# Patient Record
Sex: FEMALE | Race: WHITE
[De-identification: names, ages, dates, MRNs, and addresses within clinical notes are randomized per-mention and may not be internally consistent; named-entity substitution may affect disease eponyms.]

---

## 2023-01-05 ENCOUNTER — HOSPITAL ENCOUNTER (EMERGENCY)
Dept: HOSPITAL 47 - EC | Age: 48
Discharge: HOME | End: 2023-01-05
Payer: COMMERCIAL

## 2023-01-05 VITALS — SYSTOLIC BLOOD PRESSURE: 121 MMHG | HEART RATE: 64 BPM | DIASTOLIC BLOOD PRESSURE: 78 MMHG | TEMPERATURE: 99.1 F

## 2023-01-05 VITALS — RESPIRATION RATE: 18 BRPM

## 2023-01-05 DIAGNOSIS — Z88.2: ICD-10-CM

## 2023-01-05 DIAGNOSIS — R10.13: Primary | ICD-10-CM

## 2023-01-05 DIAGNOSIS — F32.A: ICD-10-CM

## 2023-01-05 DIAGNOSIS — Z90.49: ICD-10-CM

## 2023-01-05 DIAGNOSIS — F41.9: ICD-10-CM

## 2023-01-05 LAB
ALBUMIN SERPL-MCNC: 4.2 G/DL (ref 3.5–5)
ALP SERPL-CCNC: 61 U/L (ref 38–126)
ALT SERPL-CCNC: 19 U/L (ref 4–34)
AMYLASE SERPL-CCNC: 74 U/L (ref 30–110)
ANION GAP SERPL CALC-SCNC: 8 MMOL/L
APTT BLD: 23.4 SEC (ref 22–30)
AST SERPL-CCNC: 25 U/L (ref 14–36)
BASOPHILS # BLD AUTO: 0.1 K/UL (ref 0–0.2)
BASOPHILS NFR BLD AUTO: 1 %
BUN SERPL-SCNC: 16 MG/DL (ref 7–17)
CALCIUM SPEC-MCNC: 8.9 MG/DL (ref 8.4–10.2)
CHLORIDE SERPL-SCNC: 108 MMOL/L (ref 98–107)
CO2 SERPL-SCNC: 25 MMOL/L (ref 22–30)
EOSINOPHIL # BLD AUTO: 0.1 K/UL (ref 0–0.7)
EOSINOPHIL NFR BLD AUTO: 1 %
ERYTHROCYTE [DISTWIDTH] IN BLOOD BY AUTOMATED COUNT: 4.74 M/UL (ref 3.8–5.4)
ERYTHROCYTE [DISTWIDTH] IN BLOOD: 13.4 % (ref 11.5–15.5)
GLUCOSE SERPL-MCNC: 76 MG/DL (ref 74–99)
HCT VFR BLD AUTO: 43.8 % (ref 34–46)
HGB BLD-MCNC: 14.3 GM/DL (ref 11.4–16)
INR PPP: 0.9 (ref ?–1.2)
KETONES UR QL STRIP.AUTO: (no result)
LIPASE SERPL-CCNC: 65 U/L (ref 23–300)
LYMPHOCYTES # SPEC AUTO: 2.1 K/UL (ref 1–4.8)
LYMPHOCYTES NFR SPEC AUTO: 26 %
MCH RBC QN AUTO: 30.1 PG (ref 25–35)
MCHC RBC AUTO-ENTMCNC: 32.6 G/DL (ref 31–37)
MCV RBC AUTO: 92.5 FL (ref 80–100)
MONOCYTES # BLD AUTO: 0.4 K/UL (ref 0–1)
MONOCYTES NFR BLD AUTO: 5 %
NEUTROPHILS # BLD AUTO: 5.2 K/UL (ref 1.3–7.7)
NEUTROPHILS NFR BLD AUTO: 66 %
PH UR: 5.5 [PH] (ref 5–8)
PLATELET # BLD AUTO: 235 K/UL (ref 150–450)
POTASSIUM SERPL-SCNC: 3.8 MMOL/L (ref 3.5–5.1)
PROT SERPL-MCNC: 7.2 G/DL (ref 6.3–8.2)
PT BLD: 9.7 SEC (ref 9–12)
RBC UR QL: 2 /HPF (ref 0–5)
SODIUM SERPL-SCNC: 141 MMOL/L (ref 137–145)
SP GR UR: 1.02 (ref 1–1.03)
SQUAMOUS UR QL AUTO: 4 /HPF (ref 0–4)
UROBILINOGEN UR QL STRIP: <2 MG/DL (ref ?–2)
WBC # BLD AUTO: 7.9 K/UL (ref 3.8–10.6)
WBC # UR AUTO: 3 /HPF (ref 0–5)

## 2023-01-05 PROCEDURE — 83690 ASSAY OF LIPASE: CPT

## 2023-01-05 PROCEDURE — 81001 URINALYSIS AUTO W/SCOPE: CPT

## 2023-01-05 PROCEDURE — 84484 ASSAY OF TROPONIN QUANT: CPT

## 2023-01-05 PROCEDURE — 80053 COMPREHEN METABOLIC PANEL: CPT

## 2023-01-05 PROCEDURE — 71046 X-RAY EXAM CHEST 2 VIEWS: CPT

## 2023-01-05 PROCEDURE — 36415 COLL VENOUS BLD VENIPUNCTURE: CPT

## 2023-01-05 PROCEDURE — 85730 THROMBOPLASTIN TIME PARTIAL: CPT

## 2023-01-05 PROCEDURE — 96375 TX/PRO/DX INJ NEW DRUG ADDON: CPT

## 2023-01-05 PROCEDURE — 85610 PROTHROMBIN TIME: CPT

## 2023-01-05 PROCEDURE — 93005 ELECTROCARDIOGRAM TRACING: CPT

## 2023-01-05 PROCEDURE — 84703 CHORIONIC GONADOTROPIN ASSAY: CPT

## 2023-01-05 PROCEDURE — 83605 ASSAY OF LACTIC ACID: CPT

## 2023-01-05 PROCEDURE — 85025 COMPLETE CBC W/AUTO DIFF WBC: CPT

## 2023-01-05 PROCEDURE — 99284 EMERGENCY DEPT VISIT MOD MDM: CPT

## 2023-01-05 PROCEDURE — 96374 THER/PROPH/DIAG INJ IV PUSH: CPT

## 2023-01-05 PROCEDURE — 82150 ASSAY OF AMYLASE: CPT

## 2023-01-05 PROCEDURE — 74177 CT ABD & PELVIS W/CONTRAST: CPT

## 2023-01-05 NOTE — ED
General Adult HPI





- General


Source: patient, RN notes reviewed


Mode of arrival: ambulatory


Limitations: no limitations





<Kae Aguilar - Last Filed: 01/05/23 10:14>





- General


Source: patient, RN notes reviewed, old records reviewed





<Wilmer Roth - Last Filed: 01/05/23 15:54>





- General


Chief complaint: Abdominal Pain


Stated complaint: abd pain, vomiting


Time Seen by Provider: 01/05/23 10:14





- History of Present Illness


Initial comments: 


47 year old  female presents to the emergency department for epigastric

pain x 1 week. She notes she was at work today when the pain got worse. She 

describes the pain as burning and dull, however if she moves it will be sharp. 

She saw her PCP for this on 12/27/2022 who gave her protonix, however it has not

helped. She admits to accompanying symptoms of nausea and vomiting and noticed 

bright red, stringy mucus and foam in her vomit. Denies fever, chest pain, 

palpitations, shortness of breath, diarrhea, dysuria, hematuria. She admits to 

history of cholecystectomy and "lap band surgery."  (Kae Aguilar)


Patient is a 47-year-old female with past medical history remarkable for 

fibromyalgia, prior laparoscopic gastric band procedure, chronic pain who 

presents emergency Department complaining of worsening epigastric abdominal pain

that has been ongoing for last 3-4 weeks.  Associated with this is nausea and 

intermittent vomiting.  Denies any hematuria.  Denies any dysuria or hematuria. 

Denies any diarrhea.  Has been intermittently tolerating oral intake.  He is to 

follow-up with her physician next week does not believe that she can make it.  

Previously used daily Motrin and Tylenol but has not been recently.  Denies any 

chest pain, shortness breath.  No cardiac history.  No fevers, chills, cough.  

Denies being pregnant.  No vaginal bleeding or discharge.  Describe the pain as 

a dull-like pain that is worse when she is having episodes of emesis.  Does not 

radiate.  Does not chronically drink alcohol.  Presents for further evaluation 

at this time.  Has been attempting to use Protonix at home with minimal 

improvement in pain.  Patient does have a history of cholecystectomy.Patient was

initially evaluated by mid-level provider in the waiting room and a quick note 

was completed.  I reevaluated the patient when she is placed in a room. 

(Wilmer Roth)





- Related Data


                                Home Medications











 Medication  Instructions  Recorded  Confirmed


 


Pantoprazole Sodium [Protonix] 40 mg PO DAILY 01/05/23 01/05/23


 


Pregabalin [Lyrica] 150 mg PO BID 01/05/23 01/05/23


 


RX: traMADol HCL 50 - 100 mg PO TID PRN 01/05/23 01/05/23








                                  Previous Rx's











 Medication  Instructions  Recorded


 


Mag Hydrox/Al Hydrox/Simeth 30 ml PO BID PRN #500 ml 01/05/23





[Maalox]  


 


Ondansetron Odt [Zofran Odt] 4 mg PO Q8HR PRN 3 Days #9 tab 01/05/23


 


Pantoprazole [Protonix] 40 mg PO DAILY 7 Days #7 tab 01/05/23











                                    Allergies











Allergy/AdvReac Type Severity Reaction Status Date / Time


 


Sulfa (Sulfonamide Allergy  Rash/Hives Verified 01/05/23 14:16





Antibiotics)     














Review of Systems


ROS Other: All systems not noted in ROS Statement are negative.





<Kae Aguilar - Last Filed: 01/05/23 10:14>


ROS Other: All systems not noted in ROS Statement are negative.





<Wilmer Roth - Last Filed: 01/05/23 15:54>


ROS Statement: 


Those systems with pertinent positive or pertinent negative responses have been 

documented in the HPI.





Review of Systems:


CONST: Denies fever 


EYES: Denies blurry vision 


ENT: Denies nasal congestion  


C/V:  Denies Chest pain


RESP: Denies shortness of breath 


GI: Endorses abdominal pain


: Denies dysuria  


SKIN: Denies rash.


MSK: Denies joint pain.


NEURO: Denies headache  (Wilmer Roth)





Past Medical History


Past Medical History: No Reported History


History of Any Multi-Drug Resistant Organisms: None Reported


Past Surgical History: Cholecystectomy, Tubal Ligation


Additional Past Surgical History / Comment(s): Lap band.


Past Psychological History: Anxiety, Depression


Smoking Status: Never smoker


Past Alcohol Use History: Occasional


Past Drug Use History: None Reported





<Kae Aguilar - Last Filed: 01/05/23 10:14>





General Exam


Limitations: no limitations





<Kae Aguilar - Last Filed: 01/05/23 10:14>





<Wilmer Roth - Last Filed: 01/05/23 15:54>





- General Exam Comments


Initial Comments: 





General: Appears in no acute distress.


HEAD:  Normal with no signs of head trauma.


EYES:  PERRLA, EOMI, conjunctiva normal, no discharge.


ENT:  Hearing grossly intact, normal oropharynx.


RESPIRATORY:  Clear breath sounds bilaterally.  No wheezes, rales, or rhonchi.  


C/V:  Regular rate and rhythm. S1 and S2 auscultated, no edema, peripheral 

pulses 2+ and intact throughout


ABD: Abdomen is soft, nondistended.  Tender to palpation epigastric region.  No 

guarding.  No peritoneal signs.  No rebound tenderness.


EXT: Normal range of motion, no obvious deformity


SKIN:  No rashes or lesions observed on exposed skin.


NEURO: Alert and oriented 4. (Wilmer Roth)





Course


                                   Vital Signs











  01/05/23 01/05/23





  10:08 15:00


 


Temperature 98 F 99.1 F


 


Pulse Rate 78 64


 


Respiratory 18 18





Rate  


 


Blood Pressure 130/78 121/78


 


O2 Sat by Pulse 98 99





Oximetry  














Medical Decision Making





- Lab Data


Result diagrams: 


                                 01/05/23 12:55





                                 01/05/23 12:55





<Wilmer Roth - Last Filed: 01/05/23 15:54>





- Lab Data


                                   Lab Results











  01/05/23 01/05/23 01/05/23 Range/Units





  11:00 12:55 12:55 


 


WBC   7.9   (3.8-10.6)  k/uL


 


RBC   4.74   (3.80-5.40)  m/uL


 


Hgb   14.3   (11.4-16.0)  gm/dL


 


Hct   43.8   (34.0-46.0)  %


 


MCV   92.5   (80.0-100.0)  fL


 


MCH   30.1   (25.0-35.0)  pg


 


MCHC   32.6   (31.0-37.0)  g/dL


 


RDW   13.4   (11.5-15.5)  %


 


Plt Count   235   (150-450)  k/uL


 


MPV   8.9   


 


Neutrophils %   66   %


 


Lymphocytes %   26   %


 


Monocytes %   5   %


 


Eosinophils %   1   %


 


Basophils %   1   %


 


Neutrophils #   5.2   (1.3-7.7)  k/uL


 


Lymphocytes #   2.1   (1.0-4.8)  k/uL


 


Monocytes #   0.4   (0-1.0)  k/uL


 


Eosinophils #   0.1   (0-0.7)  k/uL


 


Basophils #   0.1   (0-0.2)  k/uL


 


PT    9.7  (9.0-12.0)  sec


 


INR    0.9  (<1.2)  


 


APTT    23.4  (22.0-30.0)  sec


 


Sodium     (137-145)  mmol/L


 


Potassium     (3.5-5.1)  mmol/L


 


Chloride     ()  mmol/L


 


Carbon Dioxide     (22-30)  mmol/L


 


Anion Gap     mmol/L


 


BUN     (7-17)  mg/dL


 


Creatinine     (0.52-1.04)  mg/dL


 


Est GFR (CKD-EPI)AfAm     (>60 ml/min/1.73 sqM)  


 


Est GFR (CKD-EPI)NonAf     (>60 ml/min/1.73 sqM)  


 


Glucose     (74-99)  mg/dL


 


Plasma Lactic Acid Marcos     (0.7-2.0)  mmol/L


 


Calcium     (8.4-10.2)  mg/dL


 


Total Bilirubin     (0.2-1.3)  mg/dL


 


AST     (14-36)  U/L


 


ALT     (4-34)  U/L


 


Alkaline Phosphatase     ()  U/L


 


Troponin I     (0.000-0.034)  ng/mL


 


Total Protein     (6.3-8.2)  g/dL


 


Albumin     (3.5-5.0)  g/dL


 


Amylase     ()  U/L


 


Lipase     ()  U/L


 


HCG, Qual     


 


Urine Color  Light Yellow    


 


Urine Appearance  Clear    (Clear)  


 


Urine pH  5.5    (5.0-8.0)  


 


Ur Specific Gravity  1.023    (1.001-1.035)  


 


Urine Protein  Negative    (Negative)  


 


Urine Glucose (UA)  Negative    (Negative)  


 


Urine Ketones  1+ H    (Negative)  


 


Urine Blood  Negative    (Negative)  


 


Urine Nitrite  Negative    (Negative)  


 


Urine Bilirubin  Negative    (Negative)  


 


Urine Urobilinogen  <2.0    (<2.0)  mg/dL


 


Ur Leukocyte Esterase  Moderate H    (Negative)  


 


Urine RBC  2    (0-5)  /hpf


 


Urine WBC  3    (0-5)  /hpf


 


Ur Squamous Epith Cells  4    (0-4)  /hpf


 


Urine Mucus  Rare H    (None)  /hpf














  01/05/23 01/05/23 01/05/23 Range/Units





  12:55 12:55 12:55 


 


WBC     (3.8-10.6)  k/uL


 


RBC     (3.80-5.40)  m/uL


 


Hgb     (11.4-16.0)  gm/dL


 


Hct     (34.0-46.0)  %


 


MCV     (80.0-100.0)  fL


 


MCH     (25.0-35.0)  pg


 


MCHC     (31.0-37.0)  g/dL


 


RDW     (11.5-15.5)  %


 


Plt Count     (150-450)  k/uL


 


MPV     


 


Neutrophils %     %


 


Lymphocytes %     %


 


Monocytes %     %


 


Eosinophils %     %


 


Basophils %     %


 


Neutrophils #     (1.3-7.7)  k/uL


 


Lymphocytes #     (1.0-4.8)  k/uL


 


Monocytes #     (0-1.0)  k/uL


 


Eosinophils #     (0-0.7)  k/uL


 


Basophils #     (0-0.2)  k/uL


 


PT     (9.0-12.0)  sec


 


INR     (<1.2)  


 


APTT     (22.0-30.0)  sec


 


Sodium  141    (137-145)  mmol/L


 


Potassium  3.8    (3.5-5.1)  mmol/L


 


Chloride  108 H    ()  mmol/L


 


Carbon Dioxide  25    (22-30)  mmol/L


 


Anion Gap  8    mmol/L


 


BUN  16    (7-17)  mg/dL


 


Creatinine  0.70    (0.52-1.04)  mg/dL


 


Est GFR (CKD-EPI)AfAm  >90    (>60 ml/min/1.73 sqM)  


 


Est GFR (CKD-EPI)NonAf  >90    (>60 ml/min/1.73 sqM)  


 


Glucose  76    (74-99)  mg/dL


 


Plasma Lactic Acid Marcos   1.1   (0.7-2.0)  mmol/L


 


Calcium  8.9    (8.4-10.2)  mg/dL


 


Total Bilirubin  0.8    (0.2-1.3)  mg/dL


 


AST  25    (14-36)  U/L


 


ALT  19    (4-34)  U/L


 


Alkaline Phosphatase  61    ()  U/L


 


Troponin I    <0.012  (0.000-0.034)  ng/mL


 


Total Protein  7.2    (6.3-8.2)  g/dL


 


Albumin  4.2    (3.5-5.0)  g/dL


 


Amylase  74    ()  U/L


 


Lipase  65    ()  U/L


 


HCG, Qual  Not Detected    


 


Urine Color     


 


Urine Appearance     (Clear)  


 


Urine pH     (5.0-8.0)  


 


Ur Specific Gravity     (1.001-1.035)  


 


Urine Protein     (Negative)  


 


Urine Glucose (UA)     (Negative)  


 


Urine Ketones     (Negative)  


 


Urine Blood     (Negative)  


 


Urine Nitrite     (Negative)  


 


Urine Bilirubin     (Negative)  


 


Urine Urobilinogen     (<2.0)  mg/dL


 


Ur Leukocyte Esterase     (Negative)  


 


Urine RBC     (0-5)  /hpf


 


Urine WBC     (0-5)  /hpf


 


Ur Squamous Epith Cells     (0-4)  /hpf


 


Urine Mucus     (None)  /hpf














Disposition





<Kae Aguilar - Last Filed: 01/05/23 10:14>


Is patient prescribed a controlled substance at d/c from ED?: No


Time of Disposition: 15:35





<Wilmer Roth - Last Filed: 01/05/23 15:54>


Clinical Impression: 


 Abdominal pain





Disposition: HOME SELF-CARE


Condition: Good


Instructions (If sedation given, give patient instructions):  Abdominal Pain 

(ED)


Prescriptions: 


Mag Hydrox/Al Hydrox/Simeth [Maalox] 30 ml PO BID PRN #500 ml


 PRN Reason: Dyspepsia


Pantoprazole [Protonix] 40 mg PO DAILY 7 Days #7 tab


Ondansetron Odt [Zofran Odt] 4 mg PO Q8HR PRN 3 Days #9 tab


 PRN Reason: Nausea


Referrals: 


Bryce Callahan MD [Primary Care Provider] - 1-2 days

## 2023-01-05 NOTE — CT
EXAMINATION TYPE: CT abdomen pelvis w con

CT DLP: 1179.2 mGycm, Automated exposure control for dose reduction was used.

 

DATE OF EXAM: 1/5/2023 1:54 PM

 

COMPARISON: None

CLINICAL INDICATION:Female, 47 years old with history of epigastric abd pain. history of lap band; ab
dominal pain and vomiting

 

TECHNIQUE:  Standard  CT of the abdomen and pelvis following the administration of 100 cc of Isovue 3
00 IV contrast material. Coronal and sagittal reformats were performed. 

 

FINDINGS: 

LOWER CHEST: Unremarkable

 

ABDOMEN

LIVER: Small region of low attenuation adjacent to the falciform ligament likely representing focal f
atty infiltration.

GALLBLADDER AND BILE DUCTS: The gallbladder is surgically absent. No biliary duct dilatation.

PANCREAS: Unremarkable.

SPLEEN: Unremarkable.

ADRENAL GLANDS: Unremarkable.

KIDNEYS AND URETERS: No evidence of hydronephrosis or renal calculus. The kidneys enhance symmetrical
ly without suspicious focal lesion.

 

PELVIS

BLADDER: Incompletely distended but grossly unremarkable.

REPRODUCTIVE: Suggested thickened appearance to the endometrium measuring up to 2.1 cm.

 

ABDOMEN & PELVIS

STOMACH AND BOWEL: Post surgical changes from gastric lap band. The duodenum is within normal limits.
 No focal wall thickening. The appendix is within normal limits. No evidence of bowel obstruction. 

PERITONEUM: No evidence of pneumoperitoneum or free fluid.

 

VASCULATURE: No evidence of aortic aneurysm. 

MUSCULOSKELETAL: No acute osseous abnormalities

LYMPH NODES: No gross evidence for lymphadenopathy.

SOFT TISSUE/ABDOMINAL WALL: Small fat filled hernia.

 

IMPRESSION:

1.  No acute abdominal/pelvic process.

2. Suggested thickening of the endometrium. Further evaluation with nonemergent pelvic ultrasound rec
ommended.

## 2023-01-05 NOTE — XR
EXAMINATION TYPE: XR chest 2V

 

DATE OF EXAM: 1/5/2023

 

COMPARISON: None

 

HISTORY: 47-year-old female upper abdominal pain

 

TECHNIQUE:  PA and lateral views

 

FINDINGS:  

Heart normal size. Aorta and pulmonary vasculature within normal limits. No consolidation or pleural 
effusion. A lap band device is noted in the upper abdomen.

 

 

IMPRESSION:  

No acute cardiopulmonary process.

## 2023-01-10 ENCOUNTER — HOSPITAL ENCOUNTER (OUTPATIENT)
Dept: HOSPITAL 47 - BARWHC3 | Age: 48
End: 2023-01-10
Attending: SURGERY
Payer: COMMERCIAL

## 2023-01-10 VITALS — BODY MASS INDEX: 32.1 KG/M2

## 2023-01-10 VITALS
HEART RATE: 86 BPM | TEMPERATURE: 98.8 F | RESPIRATION RATE: 16 BRPM | DIASTOLIC BLOOD PRESSURE: 78 MMHG | SYSTOLIC BLOOD PRESSURE: 122 MMHG

## 2023-01-10 DIAGNOSIS — K21.9: ICD-10-CM

## 2023-01-10 DIAGNOSIS — R10.9: Primary | ICD-10-CM

## 2023-01-10 DIAGNOSIS — Z88.2: ICD-10-CM

## 2023-01-10 PROCEDURE — 99212 OFFICE O/P EST SF 10 MIN: CPT

## 2023-01-10 NOTE — P.BASOAP
Subjective


Progress Note Date: 01/10/23


Principal diagnosis: 





Epigastric pain, GERD





47-year-old female known to our service.  Patient underwent previous lap band 

placement.  Starting 2-3 weeks ago patient started having epigastric burning 

pain.  This was associated with nausea and vomiting and on one episode noticed 

some hematemesis.  She is having some heartburn.  No dysphagia.  She states the 

band does not necessarily feel tight.  She thought the band was currently empty 

after previous evaluation years ago.  Patient had a pre-LAP-BAND weight of 245. 

She was able to get down to 155.  Unfortunately the patient then gained her 

weight after the band was emptied up to 245 again.  She has lost about 60 pounds

in the last few years from low-carb dieting.  Patient has tried prescription 

antiacids and Tums without relief.  She describes increased gas.





Objective





- Vital Signs


Vital signs: 


                                   Vital Signs











Temp  98.8 F   01/10/23 13:56


 


Pulse  86   01/10/23 13:56


 


Resp  16   01/10/23 13:56


 


BP  122/78   01/10/23 13:56


 


Pulse Ox      


 


FiO2      








                                 Intake & Output











 01/09/23 01/10/23 01/10/23





 18:59 06:59 18:59


 


Weight   82.1 kg














- Exam





Abdomen: Soft, mild epigastric tenderness





Assessment/Plan


(1) Abdominal pain


Narrative/Plan: 


We 7-year-old female with epigastric pain, nausea and vomiting, and GERD 

symptoms.  We will first empty the band.  Following that recommend EGD to 

evaluate for lap band erosion or ulcer.





The patient's lap band port was palpated.  The site was aseptically prepped. The

Heard needle was advanced into the port.  A total of 1.5 ml of fluid was 

removed.  Band is now empty.  Pressure was held and a sterile dressing was 

applied.





Plan: 


Date: 01/10/23





Initial Weight: 111.13 kg





Initial BMI: 43.4





Current Weight: 82.1 kg





Current BMI: 32.1





Type of Surgery: Adjustable Gastric Banding





Total Volume in Band: 





Previous Volume: 





Volume Removed: 





Volume Added: 





Band Size:

## 2023-01-12 ENCOUNTER — HOSPITAL ENCOUNTER (OUTPATIENT)
Dept: HOSPITAL 47 - ORWHC2ENDO | Age: 48
Discharge: HOME | End: 2023-01-12
Attending: SURGERY
Payer: COMMERCIAL

## 2023-01-12 VITALS — BODY MASS INDEX: 32.2 KG/M2

## 2023-01-12 VITALS — TEMPERATURE: 97.4 F | RESPIRATION RATE: 16 BRPM

## 2023-01-12 VITALS — SYSTOLIC BLOOD PRESSURE: 105 MMHG | DIASTOLIC BLOOD PRESSURE: 74 MMHG | HEART RATE: 68 BPM

## 2023-01-12 DIAGNOSIS — K29.50: Primary | ICD-10-CM

## 2023-01-12 DIAGNOSIS — Z79.899: ICD-10-CM

## 2023-01-12 DIAGNOSIS — Z88.2: ICD-10-CM

## 2023-01-12 DIAGNOSIS — F41.9: ICD-10-CM

## 2023-01-12 DIAGNOSIS — F32.A: ICD-10-CM

## 2023-01-12 DIAGNOSIS — K21.9: ICD-10-CM

## 2023-01-12 PROCEDURE — 81025 URINE PREGNANCY TEST: CPT

## 2023-01-12 PROCEDURE — 88305 TISSUE EXAM BY PATHOLOGIST: CPT

## 2023-01-12 PROCEDURE — 43239 EGD BIOPSY SINGLE/MULTIPLE: CPT

## 2023-01-12 NOTE — P.PCN
Date of Procedure: 01/12/23


Procedure(s) Performed: 


Preoperative Dx: Epigastric pain


Postoperative Dx: Mild gastritis


Procedure: EGD with Bx


Anesthesia: Sedation


Endoscopist: Dr. Gates


Specimens: Antrum


Endoscopic Procedure:   The patient was on the endoscopy table in the left 

decubitus position.  The Olympus gastroscope was inserted into the oropharynx 

and passed under direct visualization to the region of the third portion of the 

duodenum.  From that point the scope was slowly withdrawn inspecting all 

surfaces carefully.  There were no neoplastic inflammatory or polypoid lesions 

throughout the duodenum.  The pylorus was widely patent.  The stomach was 

carefully inspected.  There was mild gastritis present.  A biopsy of the antrum 

took place to rule out H. pylori.  Retroflexion revealed a normal band 

plication.  There was no evidence of erosion or prolapse.  The esophagus was 

then carefully examined.  There were no neoplastic inflammatory or polypoid 

lesions throughout the visualized esophagus.  The patient was then taken to the 

recovery room in stable condition per anesthesia guidelines.


Recommendations: Will discuss findings with the patient.  May consider band 

removal at this time.

## 2023-01-12 NOTE — P.GSHP
History of Present Illness


H&P Date: 01/12/23


Chief Complaint: Epigastric pain





47-year-old female seen in the bariatric clinic 2 days ago.  Yesterday she 

contact the office stating the pain was even worse.  We moved her EGD which was 

scheduled for next week up to today.  Mild nausea.  Symptoms did seem somewhat 

better after loosening the band she says.





Past Medical History


Past Medical History: GERD/Reflux


History of Any Multi-Drug Resistant Organisms: None Reported


Past Surgical History: Bariatric Surgery, Cholecystectomy, Tubal Ligation


Additional Past Surgical History / Comment(s): Lap band.


Past Anesthesia/Blood Transfusion Reactions: No Reported Reaction


Smoking Status: Never smoker





- Past Family History


  ** Mother


Family Medical History: No Reported History





Medications and Allergies


                                Home Medications











 Medication  Instructions  Recorded  Confirmed  Type


 


Mag Hydrox/Al Hydrox/Simeth 30 ml PO BID PRN #500 ml 01/05/23 01/12/23 Rx





[Maalox]    


 


Ondansetron Odt [Zofran Odt] 4 mg PO Q8HR PRN 3 Days #9 tab 01/05/23 01/12/23 Rx


 


Pantoprazole Sodium [Protonix] 40 mg PO DAILY 01/05/23 01/12/23 History


 


Pregabalin [Lyrica] 150 mg PO BID 01/05/23 01/12/23 History


 


traMADol HCL 50 - 100 mg PO TID PRN 01/05/23 01/12/23 History








                                    Allergies











Allergy/AdvReac Type Severity Reaction Status Date / Time


 


Sulfa (Sulfonamide Allergy  Rash/Hives Verified 01/12/23 11:55





Antibiotics)     














Surgical - Exam


                                   Vital Signs











Temp Pulse Resp BP Pulse Ox


 


 97.4 F L  63   16   115/59   98 


 


 01/12/23 12:15  01/12/23 12:15  01/12/23 12:15  01/12/23 12:15  01/12/23 12:15

















Physical exam:


General: Well-developed, well-nourished


HEENT: Normocephalic, sclerae nonicteric


Abdomen: Mild epigastric tenderness, nondistended


Extremities: No edema


Neuro: Alert and oriented





Assessment and Plan


(1) Abdominal pain


Narrative/Plan: 


Will proceed with EGD


Current Visit: No   Status: Acute   Code(s): R10.9 - UNSPECIFIED ABDOMINAL PAIN 

  SNOMED Code(s): 69042137

## 2023-08-25 ENCOUNTER — HOSPITAL ENCOUNTER (OUTPATIENT)
Dept: HOSPITAL 47 - LABPAT | Age: 48
Discharge: HOME | End: 2023-08-25
Attending: OBSTETRICS & GYNECOLOGY
Payer: COMMERCIAL

## 2023-08-25 DIAGNOSIS — Z01.812: Primary | ICD-10-CM

## 2023-08-25 DIAGNOSIS — N81.4: ICD-10-CM

## 2023-08-25 PROCEDURE — 80051 ELECTROLYTE PANEL: CPT

## 2023-08-25 PROCEDURE — 87086 URINE CULTURE/COLONY COUNT: CPT

## 2023-08-25 PROCEDURE — 85025 COMPLETE CBC W/AUTO DIFF WBC: CPT

## 2023-08-25 PROCEDURE — 86900 BLOOD TYPING SEROLOGIC ABO: CPT

## 2023-08-25 PROCEDURE — 86901 BLOOD TYPING SEROLOGIC RH(D): CPT

## 2023-08-25 PROCEDURE — 82565 ASSAY OF CREATININE: CPT

## 2023-08-25 PROCEDURE — 84520 ASSAY OF UREA NITROGEN: CPT

## 2023-08-25 PROCEDURE — 86850 RBC ANTIBODY SCREEN: CPT

## 2023-08-25 PROCEDURE — 82947 ASSAY GLUCOSE BLOOD QUANT: CPT
